# Patient Record
Sex: FEMALE
[De-identification: names, ages, dates, MRNs, and addresses within clinical notes are randomized per-mention and may not be internally consistent; named-entity substitution may affect disease eponyms.]

---

## 2018-02-20 ENCOUNTER — HOSPITAL ENCOUNTER (EMERGENCY)
Dept: HOSPITAL 14 - H.ER | Age: 43
Discharge: HOME | End: 2018-02-20
Payer: COMMERCIAL

## 2018-02-20 VITALS
RESPIRATION RATE: 20 BRPM | TEMPERATURE: 98 F | SYSTOLIC BLOOD PRESSURE: 120 MMHG | DIASTOLIC BLOOD PRESSURE: 70 MMHG | HEART RATE: 72 BPM | OXYGEN SATURATION: 98 %

## 2018-02-20 DIAGNOSIS — Z79.4: ICD-10-CM

## 2018-02-20 DIAGNOSIS — J45.901: ICD-10-CM

## 2018-02-20 DIAGNOSIS — J11.1: Primary | ICD-10-CM

## 2018-02-20 PROCEDURE — 71046 X-RAY EXAM CHEST 2 VIEWS: CPT

## 2018-02-20 PROCEDURE — 96372 THER/PROPH/DIAG INJ SC/IM: CPT

## 2018-02-20 PROCEDURE — 99283 EMERGENCY DEPT VISIT LOW MDM: CPT

## 2018-02-20 PROCEDURE — 87804 INFLUENZA ASSAY W/OPTIC: CPT

## 2018-02-20 PROCEDURE — 81025 URINE PREGNANCY TEST: CPT

## 2018-02-20 NOTE — RAD
HISTORY:

cough asthma  



COMPARISON:

No prior.



TECHNIQUE:

Chest PA and lateral



FINDINGS:



LUNGS:

No active pulmonary disease.



PLEURA:

No significant pleural effusion identified. No pneumothorax apparent.



CARDIOVASCULAR:

Normal.



OSSEOUS STRUCTURES:

No significant abnormalities.



VISUALIZED UPPER ABDOMEN:

Normal.



OTHER FINDINGS:

None.



IMPRESSION:

No active disease.

## 2018-02-20 NOTE — ED PDOC
HPI: SOB/CHF/COPD


Time Seen by Provider: 02/20/18 14:27


Chief Complaint (Nursing): Shortness Of Breath


Chief Complaint (Provider): shortness of breath


History Per: Patient


History/Exam Limitations: no limitations


Onset/Duration Of Symptoms: Days (x5)


Current Symptoms Are (Timing): Still Present


Additional Complaint(s): 





42 year old female with medical history of diabetes type I and asthma, presents 

to the emergency department with a complaint of shortness of breath associated 

with dry cough and chest tightness ongoing for 5 days. Denied any runny nose, 

sore throat, leg pain or swelling. Patient reported transient improvement with 

Albuterol inhaler and nebulizer treatments at home. 





PMD: Marilu Sabillon MD





Past Medical History


Reviewed: Historical Data, Nursing Documentation, Vital Signs


Vital Signs: 


 Last Vital Signs











Temp  98.7 F   02/20/18 14:04


 


Pulse  87   02/20/18 14:04


 


Resp  22   02/20/18 14:04


 


BP  134/87   02/20/18 14:04


 


Pulse Ox  100   02/20/18 16:27














- Medical History


PMH: Asthma, Diabetes (type I), Gall Bladder Disease





- Surgical History


Surgical History: 


   Denies: No Surg Hx


Other surgeries: tubal ligation





- Family History


Family History: States: Diabetes, Hypertension


   Denies: Unknown Family Hx





- Social History


Current smoker - smoking cessation education provided: No


Alcohol: None


Drugs: Denies





- Home Medications


Home Medications: 


 Ambulatory Orders











 Medication  Instructions  Recorded


 


Ibuprofen [Motrin] 600 mg PO Q8 PRN #6 tab 04/16/15


 


oxyCODONE/Acetaminophen [Percocet 1 ea PO BID PRN #8 tab 04/16/15





5/325 mg Tab]  


 


Albuterol 0.083% [Albuterol 3 ml IH Q4 PRN #50 neb 02/20/18





Sulfate 3 Ml]  


 


Prednisone 50 mg PO DAILY #4 tablet 02/20/18














- Allergies


Allergies/Adverse Reactions: 


 Allergies











Allergy/AdvReac Type Severity Reaction Status Date / Time


 


No Known Allergies Allergy   Verified 02/20/18 14:04














Review of Systems


ROS Statement: Except As Marked, All Systems Reviewed And Found Negative


ENT: Negative for: Nose Discharge, Throat Pain


Cardiovascular: Positive for: Chest Pain (tightness)


Respiratory: Positive for: Cough (dry), Shortness of Breath


Musculoskeletal: Negative for: Leg Pain (or swelling)





- ECG


O2 Sat by Pulse Oximetry: 100 (RA)


Pulse Ox Interpretation: Normal





Medical Decision Making


Medical Decision Making: 





Initial Impression: URI; Asthma exacerbation





Initial Plan:


* Urine dipstick


* CXR


* Duoneb 3ml INH


* Solu-medrol 125mg IM


* Influenza A B 


________________________________________________________________________________

______





Time: 1515


--Rapid flu: positive for influenza A.





Time: 1555


--CXR


FINDINGS:


LUNGS:


No active pulmonary disease.


PLEURA:


No significant pleural effusion identified. No pneumothorax apparent.


CARDIOVASCULAR:


Normal.


OSSEOUS STRUCTURES:


No significant abnormalities.


VISUALIZED UPPER ABDOMEN:


Normal.


OTHER FINDINGS:


None.





IMPRESSION:


No active disease.


________________________________________________________________________________

________





Time: 1600


--Upon provider reevaluation, patient is feeling better, medically stable and 

requires no further treatment in the ED at this time. Provider discussed with 

patient the ineffectiveness of Tamiflu at this time since patient is on day 4 

of illness. Advised plenty of rest, fluids and symptomatic treatment. Patient 

will be discharged home with Rx for Albuterol 3ml INH. Counseling was provided 

and all questions were answered regarding diagnosis and need for follow up with 

PMD. There is agreement to discharge plan. Return if symptoms persist or worsen.





Clinical Impression: Influenza; Asthma exacerbation








--------------------------------------------------------------------------------

-----------------


Scribe Attestation:


Documented by Estrellita Marquez, acting as a scribe for Ayde Bray MD.





Provider Scribe Attestation:


All medical record entries made by the Scribe were at my direction and 

personally dictated by me. I have reviewed the chart and agree that the record 

accurately reflects my personal performance of the history, physical exam, 

medical decision making, and the department course for this patient. I have 

also personally directed, reviewed, and agree with the discharge instructions 

and disposition.





Disposition





- Clinical Impression


Clinical Impression: 


 Influenza, Asthma exacerbation








- Patient ED Disposition


Is Patient to be Admitted: No


Counseled Patient/Family Regarding: Studies Performed, Diagnosis, Need For 

Followup, Rx Given





- Disposition


Referrals: 


Marilu Sabillon MD [Family Provider] - 02/26/18


Disposition: Routine/Home


Disposition Time: 16:00


Condition: IMPROVED


Additional Instructions: 


REST AND DRINK PLENTY OF HYDRATING FLUIDS





FOLLOW UP WITH DR SABILLON BY END OF WEEK OR EARLY NEXT WEEK.





RETURN TO ER FOR WORSENING SYMPTOMS: SEVERE CHEST PAIN, INABILITY TO BREATH, 

FAINTING OR NEAR FAINTING OR ANY OTHER WORRISOME SYMPTOMS


Prescriptions: 


Albuterol 0.083% [Albuterol Sulfate 3 Ml] 3 ml IH Q4 PRN #50 neb


 PRN Reason: asthma


Prednisone 50 mg PO DAILY #4 tablet


Instructions:  Flu, Asthma in Adults


Forms:  [facility] ED School/Work Excuse